# Patient Record
Sex: FEMALE | Race: WHITE | NOT HISPANIC OR LATINO | Employment: UNEMPLOYED | URBAN - METROPOLITAN AREA
[De-identification: names, ages, dates, MRNs, and addresses within clinical notes are randomized per-mention and may not be internally consistent; named-entity substitution may affect disease eponyms.]

---

## 2019-05-07 PROCEDURE — 99283 EMERGENCY DEPT VISIT LOW MDM: CPT

## 2019-05-08 ENCOUNTER — HOSPITAL ENCOUNTER (EMERGENCY)
Facility: HOSPITAL | Age: 59
Discharge: HOME | End: 2019-05-08
Attending: EMERGENCY MEDICINE | Admitting: EMERGENCY MEDICINE

## 2019-05-08 VITALS
RESPIRATION RATE: 16 BRPM | OXYGEN SATURATION: 96 % | DIASTOLIC BLOOD PRESSURE: 105 MMHG | WEIGHT: 145 LBS | TEMPERATURE: 97.5 F | HEIGHT: 63 IN | SYSTOLIC BLOOD PRESSURE: 150 MMHG | HEART RATE: 105 BPM | BODY MASS INDEX: 25.69 KG/M2

## 2019-05-08 DIAGNOSIS — S05.02XA ABRASION OF LEFT CORNEA, INITIAL ENCOUNTER: Primary | ICD-10-CM

## 2019-05-08 RX ORDER — ERYTHROMYCIN 5 MG/G
OINTMENT OPHTHALMIC EVERY 6 HOURS
Qty: 1 G | Refills: 0 | Status: SHIPPED | OUTPATIENT
Start: 2019-05-08 | End: 2019-05-13

## 2019-05-08 RX ORDER — TETRACAINE HYDROCHLORIDE 5 MG/ML
2 SOLUTION OPHTHALMIC ONCE
Status: COMPLETED | OUTPATIENT
Start: 2019-05-08 | End: 2019-05-08

## 2019-05-08 RX ORDER — KETOROLAC TROMETHAMINE 5 MG/ML
1 SOLUTION OPHTHALMIC 4 TIMES DAILY
Qty: 3 ML | Refills: 0 | OUTPATIENT
Start: 2019-05-08 | End: 2019-08-01

## 2019-05-08 RX ORDER — ERYTHROMYCIN 5 MG/G
1 OINTMENT OPHTHALMIC ONCE
Status: COMPLETED | OUTPATIENT
Start: 2019-05-08 | End: 2019-05-08

## 2019-05-08 RX ADMIN — FLUORESCEIN SODIUM 1 STRIP: 1 STRIP OPHTHALMIC at 01:00

## 2019-05-08 RX ADMIN — TETRACAINE HYDROCHLORIDE 2 DROP: 5 SOLUTION OPHTHALMIC at 01:04

## 2019-05-08 RX ADMIN — ERYTHROMYCIN 1 APPLICATION: 5 OINTMENT OPHTHALMIC at 01:35

## 2019-05-08 NOTE — ED PROVIDER NOTES
EMERGENCY DEPARTMENT ENCOUNTER    CHIEF COMPLAINT  Chief Complaint: Eye pain  History given by: Patient  History limited by: None  Room Number: 16/16  PMD: Provider, No Known      HPI:  Pt is a 59 y.o. female who presents complaining of sudden L eye pain that began earlier tonight when pt was gardening and struck herself with a branch from a [employer] plant.    Duration:  Began earlier tonight  Onset: Sudden  Timing: Constant  Location: L eye  Intensity/Severity: Moderate  Progression: Unchanged  Associated Symptoms: None stated  Previous Episodes: None  Treatment before arrival: None    PAST MEDICAL HISTORY  Active Ambulatory Problems     Diagnosis Date Noted   • No Active Ambulatory Problems     Resolved Ambulatory Problems     Diagnosis Date Noted   • No Resolved Ambulatory Problems     No Additional Past Medical History       PAST SURGICAL HISTORY  No past surgical history on file.    FAMILY HISTORY  No family history on file.    SOCIAL HISTORY  Social History     Socioeconomic History   • Marital status:      Spouse name: Not on file   • Number of children: Not on file   • Years of education: Not on file   • Highest education level: Not on file       ALLERGIES  Codeine; Oxycodone; and Wellbutrin [bupropion]    REVIEW OF SYSTEMS  Review of Systems   Constitutional: Negative for fever.   HENT: Negative for sore throat.    Eyes: Positive for pain (L).   Respiratory: Negative for cough and shortness of breath.    Cardiovascular: Negative for chest pain.   Gastrointestinal: Negative for abdominal pain, diarrhea and vomiting.   Genitourinary: Negative for dysuria.   Musculoskeletal: Negative for neck pain.   Skin: Negative for rash.   Neurological: Negative for weakness, numbness and headaches.   Hematological: Negative.    Psychiatric/Behavioral: Negative.    All other systems reviewed and are negative.      PHYSICAL EXAM  ED Triage Vitals   Temp Heart Rate Resp BP SpO2   05/07/19 2252 05/07/19 2252 05/07/19  2252 -- 05/07/19 2252   97.2 °F (36.2 °C) 114 16  95 %      Temp src Heart Rate Source Patient Position BP Location FiO2 (%)   05/07/19 2252 05/07/19 2252 -- -- --   Tympanic Monitor          Physical Exam   Constitutional: She is oriented to person, place, and time and well-developed, well-nourished, and in no distress. No distress.   Eyes: EOM are normal. Pupils are equal, round, and reactive to light. Left conjunctiva is injected (moderate).   There is watery drainage from the L eye, but no purulence.   Neck: Normal range of motion.   Cardiovascular: Normal rate and regular rhythm.   Pulmonary/Chest: Effort normal and breath sounds normal. No respiratory distress.   Neurological: She is alert and oriented to person, place, and time. She has normal sensation and normal strength.   Skin: Skin is warm and dry.   Psychiatric: Affect normal.   Nursing note and vitals reviewed.    PROGRESS AND CONSULTS     0047 Tetracaine drops administered and eye exam performed. Upon exam, there is no visible foreign bodies seen, there is a moderate sized corneal abrasion at the 12 o' clock position, with no signs of globe penetration. Plan to discharge with Acular opthalmic solution and Romycin ointment. She should f/u with an ophthalmologist. Pt understands and agrees with the plan, all questions answered.    0053 Ordered romycin ophthalmic ointment.    MEDICAL DECISION MAKING  Results were reviewed/discussed with the patient and they were also made aware of online access. Pt also made aware that some labs, such as cultures, will not be resulted during ER visit and follow up with PMD is necessary.     MDM  Number of Diagnoses or Management Options  Patient Progress  Patient progress: stable         DIAGNOSIS  Final diagnoses:   Abrasion of left cornea, initial encounter       DISPOSITION  DISCHARGE    Patient discharged in stable condition.    Reviewed implications of results, diagnosis, meds, responsibility to follow up, warning  signs and symptoms of possible worsening, potential complications and reasons to return to ER, including new or worsening sxs.    Patient/Family voiced understanding of above instructions.    Discussed plan for discharge, as there is no emergent indication for admission. Patient referred to primary care provider for BP management due to today's BP. Pt/family is agreeable and understands need for follow up and repeat testing.  Pt is aware that discharge does not mean that nothing is wrong but it indicates no emergency is present that requires admission and they must continue care with follow-up as given below or physician of their choice.     FOLLOW-UP  Loc Mcclendon MD  [address] [place] [id number]  [phone]               Medication List      New Prescriptions    erythromycin 5 MG/GM ophthalmic ointment  Commonly known as:  ROMYCIN  Administer  to the right eye Every 6 (Six) Hours for 5 days.     ketorolac 0.5 % ophthalmic solution  Commonly known as:  ACULAR  Administer 1 drop into the left eye 4 (Four) Times a Day.          Latest Documented Vital Signs:  As of 12:57 AM  BP- (!) 158/109 HR- 114 Temp- 97.2 °F (36.2 °C) (Tympanic) O2 sat- 95%    --  Documentation assistance provided by ania Wyatt for Dr. Maurer.  Information recorded by the scribe was done at my direction and has been verified and validated by me.     Odalys Wyatt  05/08/19 0057       Rick Maurer MD  05/08/19 [id number]

## 2019-05-08 NOTE — ED TRIAGE NOTES
To ER via PV.  Pt states was gardening when a branch from a [employer] plant struck her in the left eye.  Eye irritated and draining.

## 2019-08-01 ENCOUNTER — HOSPITAL ENCOUNTER (EMERGENCY)
Facility: HOSPITAL | Age: 59
Discharge: HOME | End: 2019-08-01
Attending: EMERGENCY MEDICINE | Admitting: EMERGENCY MEDICINE

## 2019-08-01 ENCOUNTER — APPOINTMENT (OUTPATIENT)
Dept: GENERAL RADIOLOGY | Facility: HOSPITAL | Age: 59
End: 2019-08-01

## 2019-08-01 VITALS
OXYGEN SATURATION: 94 % | HEART RATE: 110 BPM | HEIGHT: 64 IN | TEMPERATURE: 98.8 F | SYSTOLIC BLOOD PRESSURE: 119 MMHG | DIASTOLIC BLOOD PRESSURE: 77 MMHG | RESPIRATION RATE: 18 BRPM | BODY MASS INDEX: 25.27 KG/M2 | WEIGHT: 148 LBS

## 2019-08-01 DIAGNOSIS — S63.501A SPRAIN OF RIGHT WRIST, INITIAL ENCOUNTER: Primary | ICD-10-CM

## 2019-08-01 PROCEDURE — 73130 X-RAY EXAM OF HAND: CPT

## 2019-08-01 PROCEDURE — 99283 EMERGENCY DEPT VISIT LOW MDM: CPT

## 2019-08-01 PROCEDURE — 73110 X-RAY EXAM OF WRIST: CPT

## 2019-08-01 RX ORDER — IBUPROFEN 600 MG/1
600 TABLET ORAL EVERY 6 HOURS PRN
Qty: 24 TABLET | Refills: 0 | Status: SHIPPED | OUTPATIENT
Start: 2019-08-01

## 2019-08-01 RX ORDER — PROPRANOLOL HYDROCHLORIDE 10 MG/1
10 TABLET ORAL 3 TIMES DAILY PRN
COMMUNITY

## 2019-08-01 RX ORDER — BUPROPION HYDROCHLORIDE 150 MG/1
150 TABLET ORAL DAILY
COMMUNITY

## 2019-08-01 NOTE — ED NOTES
Pt reports pulling weeds last night and fell backwards landing on her right wrist. Trace swelling noted to right hand and wrist.      Sharmin Ferreira RN  08/01/19 3365

## 2019-08-01 NOTE — ED PROVIDER NOTES
" EMERGENCY DEPARTMENT ENCOUNTER    CHIEF COMPLAINT  Chief Complaint: wrist pain  History given by: patient  History limited by: none  Room Number: 04/04  PMD: Rima Nguyen MD      HPI:  Pt is a 59 y.o. female who presents right hand dominant complaining of right wrist pain that began last night at 2000. Pt states that she was pulling weeds between bricks when it occurred. Pt states that she was pulling a \"hard weed\" and fell back wards and caught herself on an outstretched hand. Pt denies right elbow, shoulder pain, or any other sx. Pt has not taken anything for pain.       Duration:  One day  Onset: gradual  Timing: constant  Location: right wrist   Quality: pain  Intensity/Severity: moderate  Progression: unchanged  Associated Symptoms: none  Treatment before arrival: none    PAST MEDICAL HISTORY  Active Ambulatory Problems     Diagnosis Date Noted   • No Active Ambulatory Problems     Resolved Ambulatory Problems     Diagnosis Date Noted   • No Resolved Ambulatory Problems     Past Medical History:   Diagnosis Date   • Arteriosclerosis    • Arthritis    • Depression    • Irritable bowel syndrome    • Osteoporosis        PAST SURGICAL HISTORY  Past Surgical History:   Procedure Laterality Date   • OVARIAN CYST REMOVAL         FAMILY HISTORY  History reviewed. No pertinent family history.    SOCIAL HISTORY  Social History     Socioeconomic History   • Marital status: Single     Spouse name: Not on file   • Number of children: Not on file   • Years of education: Not on file   • Highest education level: Not on file   Tobacco Use   • Smoking status: Current Every Day Smoker     Packs/day: 1.50     Years: 10.00     Pack years: 15.00   • Smokeless tobacco: Never Used   Substance and Sexual Activity   • Alcohol use: Yes     Comment: occasionally   • Drug use: Defer   • Sexual activity: Defer       ALLERGIES  Codeine; Oxycodone; and Wellbutrin [bupropion]    REVIEW OF SYSTEMS  Review of Systems   HENT: Negative for " nosebleeds.    Eyes: Negative for visual disturbance.   Respiratory: Negative for shortness of breath.    Cardiovascular: Negative for chest pain.   Gastrointestinal: Negative for vomiting.   Musculoskeletal: Positive for arthralgias (right wrist).   Skin: Negative for wound.   Neurological: Negative for weakness.   All other systems reviewed and are negative.      PHYSICAL EXAM  ED Triage Vitals [08/01/19 1039]   Temp Heart Rate Resp BP SpO2   98.8 °F (37.1 °C) 119 18 -- 94 %      Temp src Heart Rate Source Patient Position BP Location FiO2 (%)   -- -- -- -- --       Physical Exam   Constitutional: No distress.   HENT:   Head: Normocephalic and atraumatic.   Cardiovascular: Normal rate and regular rhythm.   Pulmonary/Chest: Effort normal. No respiratory distress.   Musculoskeletal:   RUExt:  Tender to base of the first/second metacarpals with associated snuff box tenderness  Good cap refill and sensation to all fingers of right hand  Good , flex/ext all fingers  Shoulder/elbow nonTTPalp, no deformity    Nursing note and vitals reviewed.        RADIOLOGY  XR Wrist 3+ View Right   There is no evidence of fracture involving the hand or wrist.  There are prominent degenerative changes at the articulation of the  navicular bone with the multangular bones as well as some scattered  degenerative changes involving the first MCP joint and interphalangeal  joints, particularly the 3rd PIP joint. There is also a somewhat angular  density in the soft tissues near the head of the 3rd proximal phalanx  suspicious for a radiopaque foreign body measuring up to 4 mm and  clinical correlation to this area is recommended.      XR Hand 3+ View Right   There is no evidence of fracture involving the hand or wrist.  There are prominent degenerative changes at the articulation of the  navicular bone with the multangular bones as well as some scattered  degenerative changes involving the first MCP joint and interphalangeal  joints,  particularly the 3rd PIP joint. There is also a somewhat angular  density in the soft tissues near the head of the 3rd proximal phalanx  suspicious for a radiopaque foreign body measuring up to 4 mm and  clinical correlation to this area is recommended.           I ordered the above noted radiological studies. Interpreted by radiologist. Reviewed by me in PACS.       PROCEDURES  Procedures      PROGRESS AND CONSULTS     1055-Pt declined pain medications.    1058-Ordered XR right wrist and XR right hand for further evaluation. Ice ordered. Pt advised to elevate her wrist.     1213-Rechecked pt. Pt is resting comfortably. Notified pt of XR right hand and XR right wrist negative acute. Discussed with pt plan to place her in a splint. Discussed the plan to discharge the pt home with prescriptions for advil. I instructed the pt to f/u with her PCP and hand surgery for further care and evaluation. Pt understands and agrees with the plan, all questions answered.      MEDICAL DECISION MAKING  Results were reviewed/discussed with the patient and they were also made aware of online access. Pt also made aware that some labs, such as cultures, will not be resulted during ER visit and follow up with PMD is necessary.     MDM  Number of Diagnoses or Management Options  Sprain of right wrist, initial encounter:      Amount and/or Complexity of Data Reviewed  Tests in the radiology section of CPT®: reviewed and ordered (XR right hand and wrist-negative for fracture)  Decide to obtain previous medical records or to obtain history from someone other than the patient: yes  Independent visualization of images, tracings, or specimens: yes           DIAGNOSIS  Final diagnoses:   Sprain of right wrist, initial encounter       DISPOSITION  DISCHARGE    Patient discharged in stable condition.    Reviewed implications of results, diagnosis, meds, responsibility to follow up, warning signs and symptoms of possible worsening, potential  complications and reasons to return to ER.    Patient/Family voiced understanding of above instructions.    Discussed plan for discharge, as there is no emergent indication for admission. Patient referred to primary care provider for BP management due to today's BP. Pt/family is agreeable and understands need for follow up and repeat testing.  Pt is aware that discharge does not mean that nothing is wrong but it indicates no emergency is present that requires admission and they must continue care with follow-up as given below or physician of their choice.     FOLLOW-UP  Rima Nguyen MD  [address] [facility] [id number] [phone]    Schedule an appointment as soon as possible for a visit   For re-evaluation and follow up    John Monterroso MD  [address] [facility], [facility] [address]  [place] [id number] [phone]    Call   As needed, If symptoms worsen, for persistant pain         Medication List      New Prescriptions    ibuprofen 600 MG tablet  Commonly known as:  ADVIL,MOTRIN  Take 1 tablet by mouth Every 6 (Six) Hours As Needed for Mild Pain  or   Moderate Pain  (take with food).        Stop    ketorolac 0.5 % ophthalmic solution  Commonly known as:  ACULAR              Latest Documented Vital Signs:  As of 12:16 PM  BP- 119/77 HR- 110 Temp- 98.8 °F (37.1 °C) O2 sat- 94%    --  Documentation assistance provided by ania Mahan for MD Sarah.  Information recorded by the scribe was done at my direction and has been verified and validated by me.          Laxmi Mahan  08/01/19 [id number]       Taisha Gatuam MD  08/01/19 9232

## 2019-08-01 NOTE — DISCHARGE INSTRUCTIONS
You are advised to follow closely with Dr. Nguyen in 5-7 days for recheck, final results of lab work and imaging testing, and further testing/treatment as needed.    Follow Dr. Monterroso or hand specialist of your choice in 1 to 2 weeks for persistent pain.  Use Velcro wrist splint as needed for comfort.  Ice and elevate your right wrist at least 3 times daily.    Please return to the emergency department immediately with chest pain different than usual for you, shortness of air, abdominal pain, persistent vomiting/fever, blood in emesis or stool, lightheadedness/fainting, problems with speech, one sided weakness/numbness, new incontinence, problems with vision, increased redness/swelling/pain, weakness or numbness to right hand or for worsening of symptoms or other concerns.